# Patient Record
Sex: MALE | Employment: UNEMPLOYED | URBAN - METROPOLITAN AREA
[De-identification: names, ages, dates, MRNs, and addresses within clinical notes are randomized per-mention and may not be internally consistent; named-entity substitution may affect disease eponyms.]

---

## 2021-02-19 ENCOUNTER — OFFICE VISIT (OUTPATIENT)
Dept: FAMILY MEDICINE | Facility: CLINIC | Age: 56
End: 2021-02-19

## 2021-02-19 VITALS
SYSTOLIC BLOOD PRESSURE: 158 MMHG | DIASTOLIC BLOOD PRESSURE: 100 MMHG | BODY MASS INDEX: 24.4 KG/M2 | OXYGEN SATURATION: 98 % | TEMPERATURE: 98.5 F | WEIGHT: 161 LBS | HEIGHT: 68 IN | HEART RATE: 87 BPM

## 2021-02-19 DIAGNOSIS — R21 RASH: ICD-10-CM

## 2021-02-19 DIAGNOSIS — L98.9 SKIN SORE: ICD-10-CM

## 2021-02-19 DIAGNOSIS — R68.2 DRY MOUTH: ICD-10-CM

## 2021-02-19 DIAGNOSIS — R03.0 ELEVATED BP WITHOUT DIAGNOSIS OF HYPERTENSION: Primary | ICD-10-CM

## 2021-02-19 LAB
ALBUMIN UR-MCNC: NEGATIVE MG/DL
APPEARANCE UR: CLEAR
BILIRUB UR QL STRIP: NEGATIVE
COLOR UR AUTO: YELLOW
CRP SERPL-MCNC: <2.9 MG/L (ref 0–8)
DEPRECATED CALCIDIOL+CALCIFEROL SERPL-MC: 39 UG/L (ref 20–75)
ERYTHROCYTE [DISTWIDTH] IN BLOOD BY AUTOMATED COUNT: 11.7 % (ref 10–15)
ERYTHROCYTE [SEDIMENTATION RATE] IN BLOOD BY WESTERGREN METHOD: 7 MM/H (ref 0–20)
GLUCOSE UR STRIP-MCNC: NEGATIVE MG/DL
HCT VFR BLD AUTO: 46.3 % (ref 40–53)
HGB BLD-MCNC: 15.8 G/DL (ref 13.3–17.7)
HGB UR QL STRIP: NEGATIVE
KETONES UR STRIP-MCNC: NEGATIVE MG/DL
LEUKOCYTE ESTERASE UR QL STRIP: NEGATIVE
MCH RBC QN AUTO: 30.7 PG (ref 26.5–33)
MCHC RBC AUTO-ENTMCNC: 34.1 G/DL (ref 31.5–36.5)
MCV RBC AUTO: 90 FL (ref 78–100)
NITRATE UR QL: NEGATIVE
PH UR STRIP: 5.5 PH (ref 5–7)
PLATELET # BLD AUTO: 244 10E9/L (ref 150–450)
RBC # BLD AUTO: 5.15 10E12/L (ref 4.4–5.9)
SOURCE: NORMAL
SP GR UR STRIP: <=1.005 (ref 1–1.03)
UROBILINOGEN UR STRIP-ACNC: 0.2 EU/DL (ref 0.2–1)
WBC # BLD AUTO: 8.5 10E9/L (ref 4–11)

## 2021-02-19 PROCEDURE — 85652 RBC SED RATE AUTOMATED: CPT | Performed by: NURSE PRACTITIONER

## 2021-02-19 PROCEDURE — 81003 URINALYSIS AUTO W/O SCOPE: CPT | Performed by: NURSE PRACTITIONER

## 2021-02-19 PROCEDURE — 80053 COMPREHEN METABOLIC PANEL: CPT | Performed by: NURSE PRACTITIONER

## 2021-02-19 PROCEDURE — 82043 UR ALBUMIN QUANTITATIVE: CPT | Performed by: NURSE PRACTITIONER

## 2021-02-19 PROCEDURE — 86038 ANTINUCLEAR ANTIBODIES: CPT | Performed by: NURSE PRACTITIONER

## 2021-02-19 PROCEDURE — 36415 COLL VENOUS BLD VENIPUNCTURE: CPT | Performed by: NURSE PRACTITIONER

## 2021-02-19 PROCEDURE — 99204 OFFICE O/P NEW MOD 45 MIN: CPT | Performed by: NURSE PRACTITIONER

## 2021-02-19 PROCEDURE — 84443 ASSAY THYROID STIM HORMONE: CPT | Performed by: NURSE PRACTITIONER

## 2021-02-19 PROCEDURE — 86140 C-REACTIVE PROTEIN: CPT | Performed by: NURSE PRACTITIONER

## 2021-02-19 PROCEDURE — 82977 ASSAY OF GGT: CPT | Performed by: NURSE PRACTITIONER

## 2021-02-19 PROCEDURE — 85027 COMPLETE CBC AUTOMATED: CPT | Performed by: NURSE PRACTITIONER

## 2021-02-19 PROCEDURE — 86431 RHEUMATOID FACTOR QUANT: CPT | Performed by: NURSE PRACTITIONER

## 2021-02-19 PROCEDURE — 82306 VITAMIN D 25 HYDROXY: CPT | Performed by: NURSE PRACTITIONER

## 2021-02-19 RX ORDER — TRIAMCINOLONE ACETONIDE 1 MG/G
CREAM TOPICAL 2 TIMES DAILY
Qty: 30 G | Refills: 0 | Status: SHIPPED | OUTPATIENT
Start: 2021-02-19

## 2021-02-19 RX ORDER — MUPIROCIN 20 MG/G
OINTMENT TOPICAL 3 TIMES DAILY
Qty: 30 G | Refills: 0 | Status: SHIPPED | OUTPATIENT
Start: 2021-02-19 | End: 2021-03-01

## 2021-02-19 ASSESSMENT — MIFFLIN-ST. JEOR: SCORE: 1536.85

## 2021-02-19 NOTE — LETTER
[facility]  [address] [place] [address]  [phone]                    February 23, 2021    Giovanny Marcelino  [address] [facility] [address]      Dear Giovanny,    Here is a summary of your recent test results:      All of your labs are normal. I would continue with plan of care to be seen in clinic for your wellness exam.            Thank you very much for trusting [facility].     Healthy regards,      Felisa Bowie, [facility]         Results for orders placed or performed in visit on 02/19/21   Albumin Random Urine Quantitative with Creat Ratio     Status: None   Result Value Ref Range    Creatinine Urine 20 mg/dL    Albumin Urine mg/L <5 mg/L    Albumin Urine mg/g Cr Unable to calculate due to low value 0 - 17 mg/g Cr   *UA reflex to Microscopic and Culture ([facility] (except [place] and [place])     Status: None    Specimen: Midstream Urine   Result Value Ref Range    Color Urine Yellow     Appearance Urine Clear     Glucose Urine Negative NEG^Negative mg/dL    Bilirubin Urine Negative NEG^Negative    Ketones Urine Negative NEG^Negative mg/dL    Specific Gravity Urine <=1.005 1.003 - 1.035    Blood Urine Negative NEG^Negative    pH Urine 5.5 5.0 - 7.0 pH    Protein Albumin Urine Negative NEG^Negative mg/dL    Urobilinogen Urine 0.2 0.2 - 1.0 EU/dL    Nitrite Urine Negative NEG^Negative    Leukocyte Esterase Urine Negative NEG^Negative    Source Midstream Urine    Anti Nuclear Emiliana IgG by IFA with Reflex     Status: None   Result Value Ref Range    AUGUSTIN interpretation Negative NEG^Negative   Rheumatoid factor     Status: None   Result Value Ref Range    Rheumatoid Factor <7 <12 IU/mL   CRP, inflammation     Status: None   Result Value Ref Range    CRP Inflammation <2.9 0.0 - 8.0 mg/L   ESR: Erythrocyte sedimentation rate     Status: None   Result Value Ref Range    Sed Rate 7 0 - 20 mm/h   CBC with platelets      Status: None   Result Value Ref Range    WBC 8.5 4.0 - 11.0 10e9/L    RBC Count 5.15 4.4 - 5.9 10e12/L    Hemoglobin 15.8 13.3 - 17.7 g/dL    Hematocrit 46.3 40.0 - 53.0 %    MCV 90 78 - 100 fl    MCH 30.7 26.5 - 33.0 pg    MCHC 34.1 31.5 - 36.5 g/dL    RDW 11.7 10.0 - 15.0 %    Platelet Count 244 150 - 450 10e9/L   Vitamin D Deficiency     Status: None   Result Value Ref Range    Vitamin D Deficiency screening 39 20 - 75 ug/L   TSH with free T4 reflex     Status: None   Result Value Ref Range    TSH 3.22 0.40 - 4.00 mU/L   Comprehensive metabolic panel (BMP + Alb, Alk Phos, ALT, AST, Total. Bili, TP)     Status: None   Result Value Ref Range    Sodium 135 133 - 144 mmol/L    Potassium 4.4 3.4 - 5.3 mmol/L    Chloride 102 94 - 109 mmol/L    Carbon Dioxide 25 20 - 32 mmol/L    Anion Gap 8 3 - 14 mmol/L    Glucose 76 70 - 99 mg/dL    Urea Nitrogen 10 7 - 30 mg/dL    Creatinine 0.96 0.66 - 1.25 mg/dL    GFR Estimate 89 >60 mL/min/[1.73_m2]    GFR Estimate If Black >90 >60 mL/min/[1.73_m2]    Calcium 9.7 8.5 - 10.1 mg/dL    Bilirubin Total 0.6 0.2 - 1.3 mg/dL    Albumin 4.0 3.4 - 5.0 g/dL    Protein Total 7.7 6.8 - 8.8 g/dL    Alkaline Phosphatase 88 40 - 150 U/L    ALT 37 0 - 70 U/L    AST 28 0 - 45 U/L   GGT     Status: None   Result Value Ref Range    GGT 36 0 - 75 U/L

## 2021-02-19 NOTE — PROGRESS NOTES
Assessment & Plan     Elevated BP without diagnosis of hypertension  Labs today.  Recheck in the next few weeks with repeat blood pressure check if continues to be elevated would recommend medication.  Decrease alcohol intake healthy eating including lower salt content in foods.  - Albumin Random Urine Quantitative with Creat Ratio  - *UA reflex to Microscopic and Culture ([facility] (except [place] and [place])  - CBC with platelets  - Comprehensive metabolic panel (BMP + Alb, Alk Phos, ALT, AST, Total. Bili, TP)  - GGT    Rash  Skin sore  Rash of unknown etiology actually looks more to me like sores will treat with Bactroban he can also place steroid cream on any rash that is not open sore.  Do not feel oral antibiotics are warranted at this time but will continue to monitor for this.  Full panel of lab work after long discussion about cost and he agrees to this work-up in its entirety.  Close follow-up within a few weeks to see if he is having resolution of symptoms.  Dermatology as needed.   Giovanny verbalizes understanding of plan of care and is in agreement.   - *UA reflex to Microscopic and Culture ([facility] (except [place] and [place])  - triamcinolone (KENALOG) 0.1 % external cream  Dispense: 30 g; Refill: 0  - Anti Nuclear Emiliana IgG by IFA with Reflex  - Rheumatoid factor  - CRP, inflammation  - ESR: Erythrocyte sedimentation rate  - CBC with platelets  - Vitamin D Deficiency  - mupirocin (BACTROBAN) 2 % external ointment  Dispense: 30 g; Refill: 0  - TSH with free T4 reflex  - Comprehensive metabolic panel (BMP + Alb, Alk Phos, ALT, AST, Total. Bili, TP)    Dry mouth    - Anti Nuclear Emiliana IgG by IFA with Reflex    Tobacco Cessation:   reports that he has been smoking. He has never used smokeless tobacco.  Tobacco Cessation Action Plan: Self help information given to patient    Return in about 4 weeks (around 3/19/2021) for Wellness exam fasting labs.    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 Couple bad infections - one possible from plants in left leg, another wound on rocks 2 years old.     Rash  Onset/Duration: x1 year - approx - back ion [place] for 1 year  Description  Location: all over - seems to by symmetrical - starts as little dots in 3 dots. Red face and face - is outside a lot all year around   Character: raised, flakey, red, new ones on arms in last week - scarabs with red around - round  Itching: no  Intensity:  mild  Progression of Symptoms:  waxing and waning  Accompanying signs and symptoms:   Fever: no  Body aches or joint pain: YES- left knee - aches  Sore throat symptoms: no  Recent cold symptoms: no  History:           Previous episodes of similar rash: None  New exposures:  None  Recent travel: no  Exposure to similar rash: no  Precipitating or alleviating factors:   Therapies tried and outcome: hydrocortisone cream, antihistamine -  Goes away after     Here as a new patient to establish care he would like to be tested for lupus.  Has a rash on his arms of unknown etiology.  He also reports he gets intermittent scattered rashing and very dry mouth.  He has no concern for STD including HIV or hep C.  Is long overdue for health and wellness physical examination he is unclear if he is had previous elevations in blood pressure.  Alcohol 3 times a week 5 beers  Denies drugs history.   Lived in [place] and felt he would get rashes intermittently. Not overall joint pain but states has intermittent.Left knee pain.    Has no insurance but reports he wants all the lab work done as he is unclear of what is going on.    Review of Systems   Constitutional, HEENT, cardiovascular, pulmonary, GI, , musculoskeletal, neuro, skin, endocrine and psych systems are negative, except as  "otherwise noted in the HPI.      Objective    BP (!) 158/100 (BP Location: Right arm, Patient Position: Chair, Cuff Size: Adult Regular)   Pulse 87   Temp 98.5  F (36.9  C) (Tympanic)   Ht 1.715 m (5' 7.5\")   Wt 73 kg (161 lb)   SpO2 98%   BMI 24.84 kg/m    Body mass index is 24.84 kg/m .  Physical Exam   GENERAL: healthy, alert and no distress  RESP: lungs clear to auscultation - no rales, rhonchi or wheezes  CV: regular rate and rhythm, normal S1 S2, no S3 or S4, no murmur, click or rub, no peripheral edema and peripheral pulses strong  ABDOMEN: soft, nontender, no hepatosplenomegaly, no masses and bowel sounds normal  MS: no gross musculoskeletal defects noted, no edema  SKIN: scattered healing sores on bilateral upper arms to mid forearms different healing stages otherwise skin is all within normal limits  NEURO: Normal strength and tone, mentation intact and speech normal  PSYCH: mentation appears normal, affect normal/bright somewhat pressured speech    Results for orders placed or performed in visit on 02/19/21   Albumin Random Urine Quantitative with Creat Ratio     Status: None   Result Value Ref Range    Creatinine Urine 20 mg/dL    Albumin Urine mg/L <5 mg/L    Albumin Urine mg/g Cr Unable to calculate due to low value 0 - 17 mg/g Cr   *UA reflex to Microscopic and Culture ([facility] (except [place] and [place])     Status: None    Specimen: Midstream Urine   Result Value Ref Range    Color Urine Yellow     Appearance Urine Clear     Glucose Urine Negative NEG^Negative mg/dL    Bilirubin Urine Negative NEG^Negative    Ketones Urine Negative NEG^Negative mg/dL    Specific Gravity Urine <=1.005 1.003 - 1.035    Blood Urine Negative NEG^Negative    pH Urine 5.5 5.0 - 7.0 pH    Protein Albumin Urine Negative NEG^Negative mg/dL    Urobilinogen Urine 0.2 0.2 - 1.0 EU/dL    Nitrite Urine Negative NEG^Negative    Leukocyte Esterase Urine Negative NEG^Negative    Source Midstream Urine  "   Anti Nuclear Emiliana IgG by IFA with Reflex     Status: None   Result Value Ref Range    AUGUSTIN interpretation Negative NEG^Negative   Rheumatoid factor     Status: None   Result Value Ref Range    Rheumatoid Factor <7 <12 IU/mL   CRP, inflammation     Status: None   Result Value Ref Range    CRP Inflammation <2.9 0.0 - 8.0 mg/L   ESR: Erythrocyte sedimentation rate     Status: None   Result Value Ref Range    Sed Rate 7 0 - 20 mm/h   CBC with platelets     Status: None   Result Value Ref Range    WBC 8.5 4.0 - 11.0 10e9/L    RBC Count 5.15 4.4 - 5.9 10e12/L    Hemoglobin 15.8 13.3 - 17.7 g/dL    Hematocrit 46.3 40.0 - 53.0 %    MCV 90 78 - 100 fl    MCH 30.7 26.5 - 33.0 pg    MCHC 34.1 31.5 - 36.5 g/dL    RDW 11.7 10.0 - 15.0 %    Platelet Count 244 150 - 450 10e9/L   Vitamin D Deficiency     Status: None   Result Value Ref Range    Vitamin D Deficiency screening 39 20 - 75 ug/L   TSH with free T4 reflex     Status: None   Result Value Ref Range    TSH 3.22 0.40 - 4.00 mU/L   Comprehensive metabolic panel (BMP + Alb, Alk Phos, ALT, AST, Total. Bili, TP)     Status: None   Result Value Ref Range    Sodium 135 133 - 144 mmol/L    Potassium 4.4 3.4 - 5.3 mmol/L    Chloride 102 94 - 109 mmol/L    Carbon Dioxide 25 20 - 32 mmol/L    Anion Gap 8 3 - 14 mmol/L    Glucose 76 70 - 99 mg/dL    Urea Nitrogen 10 7 - 30 mg/dL    Creatinine 0.96 0.66 - 1.25 mg/dL    GFR Estimate 89 >60 mL/min/[1.73_m2]    GFR Estimate If Black >90 >60 mL/min/[1.73_m2]    Calcium 9.7 8.5 - 10.1 mg/dL    Bilirubin Total 0.6 0.2 - 1.3 mg/dL    Albumin 4.0 3.4 - 5.0 g/dL    Protein Total 7.7 6.8 - 8.8 g/dL    Alkaline Phosphatase 88 40 - 150 U/L    ALT 37 0 - 70 U/L    AST 28 0 - 45 U/L   GGT     Status: None   Result Value Ref Range    GGT 36 0 - 75 U/L

## 2021-02-20 LAB
ALBUMIN SERPL-MCNC: 4 G/DL (ref 3.4–5)
ALP SERPL-CCNC: 88 U/L (ref 40–150)
ALT SERPL W P-5'-P-CCNC: 37 U/L (ref 0–70)
ANION GAP SERPL CALCULATED.3IONS-SCNC: 8 MMOL/L (ref 3–14)
AST SERPL W P-5'-P-CCNC: 28 U/L (ref 0–45)
BILIRUB SERPL-MCNC: 0.6 MG/DL (ref 0.2–1.3)
BUN SERPL-MCNC: 10 MG/DL (ref 7–30)
CALCIUM SERPL-MCNC: 9.7 MG/DL (ref 8.5–10.1)
CHLORIDE SERPL-SCNC: 102 MMOL/L (ref 94–109)
CO2 SERPL-SCNC: 25 MMOL/L (ref 20–32)
CREAT SERPL-MCNC: 0.96 MG/DL (ref 0.66–1.25)
CREAT UR-MCNC: 20 MG/DL
GFR SERPL CREATININE-BSD FRML MDRD: 89 ML/MIN/{1.73_M2}
GGT SERPL-CCNC: 36 U/L (ref 0–75)
GLUCOSE SERPL-MCNC: 76 MG/DL (ref 70–99)
MICROALBUMIN UR-MCNC: <5 MG/L
MICROALBUMIN/CREAT UR: NORMAL MG/G CR (ref 0–17)
POTASSIUM SERPL-SCNC: 4.4 MMOL/L (ref 3.4–5.3)
PROT SERPL-MCNC: 7.7 G/DL (ref 6.8–8.8)
SODIUM SERPL-SCNC: 135 MMOL/L (ref 133–144)
TSH SERPL DL<=0.005 MIU/L-ACNC: 3.22 MU/L (ref 0.4–4)

## 2021-02-22 LAB
ANA SER QL IF: NEGATIVE
RHEUMATOID FACT SER NEPH-ACNC: <7 IU/ML (ref 0–20)

## 2021-02-23 NOTE — RESULT ENCOUNTER NOTE
Note to Staff: please send a result letter    Giovanny,    All of your labs are normal. I would continue with plan of care to be seen in clinic for your wellness exam.    Thank you for choosing [facility].  It was an honor and a privilege to participate in your care.       Healthy regards,     Felisa Bowie, FNP-BC